# Patient Record
Sex: FEMALE | Race: OTHER | Employment: PART TIME | URBAN - METROPOLITAN AREA
[De-identification: names, ages, dates, MRNs, and addresses within clinical notes are randomized per-mention and may not be internally consistent; named-entity substitution may affect disease eponyms.]

---

## 2019-08-18 ENCOUNTER — APPOINTMENT (OUTPATIENT)
Dept: GENERAL RADIOLOGY | Age: 49
End: 2019-08-18
Attending: PHYSICIAN ASSISTANT
Payer: OTHER MISCELLANEOUS

## 2019-08-18 ENCOUNTER — HOSPITAL ENCOUNTER (OUTPATIENT)
Age: 49
Discharge: HOME | End: 2019-08-18
Payer: OTHER MISCELLANEOUS

## 2019-08-18 VITALS
SYSTOLIC BLOOD PRESSURE: 111 MMHG | OXYGEN SATURATION: 98 % | DIASTOLIC BLOOD PRESSURE: 73 MMHG | TEMPERATURE: 98 F | HEART RATE: 84 BPM | WEIGHT: 272 LBS | RESPIRATION RATE: 18 BRPM | BODY MASS INDEX: 38.94 KG/M2 | HEIGHT: 70 IN

## 2019-08-18 DIAGNOSIS — M17.11 OSTEOARTHRITIS OF RIGHT KNEE, UNSPECIFIED OSTEOARTHRITIS TYPE: ICD-10-CM

## 2019-08-18 DIAGNOSIS — M25.461 EFFUSION OF RIGHT KNEE: Primary | ICD-10-CM

## 2019-08-18 PROCEDURE — 99203 OFFICE O/P NEW LOW 30 MIN: CPT

## 2019-08-18 PROCEDURE — 73560 X-RAY EXAM OF KNEE 1 OR 2: CPT | Performed by: PHYSICIAN ASSISTANT

## 2019-08-18 NOTE — ED INITIAL ASSESSMENT (HPI)
Patient states that while at work on Friday 8-16-19 was walking in the cafeteria and slipped on a wet floor. Patient states that she fell to the ground and hit her right knee on the floor. Denies hitting her head or any LOC.  Patient complains of right knee

## 2019-08-18 NOTE — ED PROVIDER NOTES
Patient Seen in: [facility]    History   Patient presents with:  Lower Extremity Injury (musculoskeletal)    Stated Complaint: Work Injury, pain to leg (L), Knee (R)    HPI    45-year-old female here with complaint of pain and l well-developed and well-nourished. HENT:   Head: Normocephalic. Right Ear: External ear normal.   Left Ear: External ear normal.   Nose: Nose normal.   Mouth/Throat: Oropharynx is clear and moist.   Eyes: Pupils are equal, round, and reactive to light. Treatment: Please wear the Ace wrap as provided. Rest ice compression elevation. Take naproxen twice daily with food. Make a follow-up appointment with occupational medicine for further evaluation and treatment.       The patient is in good condition thr

## 2020-03-03 ENCOUNTER — HOSPITAL ENCOUNTER (OUTPATIENT)
Age: 50
Discharge: HOME | End: 2020-03-03
Attending: FAMILY MEDICINE
Payer: COMMERCIAL

## 2020-03-03 VITALS
DIASTOLIC BLOOD PRESSURE: 84 MMHG | TEMPERATURE: 98 F | WEIGHT: 272 LBS | HEART RATE: 106 BPM | BODY MASS INDEX: 38.94 KG/M2 | OXYGEN SATURATION: 99 % | RESPIRATION RATE: 20 BRPM | SYSTOLIC BLOOD PRESSURE: 118 MMHG | HEIGHT: 70 IN

## 2020-03-03 DIAGNOSIS — L03.311 CELLULITIS OF ABDOMINAL WALL: Primary | ICD-10-CM

## 2020-03-03 PROCEDURE — 99214 OFFICE O/P EST MOD 30 MIN: CPT

## 2020-03-03 PROCEDURE — 99213 OFFICE O/P EST LOW 20 MIN: CPT

## 2020-03-03 RX ORDER — SULFAMETHOXAZOLE AND TRIMETHOPRIM 800; 160 MG/1; MG/1
2 TABLET ORAL 2 TIMES DAILY
Qty: 40 TABLET | Refills: 0 | Status: SHIPPED | OUTPATIENT
Start: 2020-03-03 | End: 2020-03-13

## 2020-03-03 NOTE — ED INITIAL ASSESSMENT (HPI)
Abscess - abdomen with skin redness around  It. About size of dime Friday, she states it was getting bigger she applied warm compress Saturday. Yesterday it started to drain  Yellow pus with blood. Denies fever.

## 2020-03-03 NOTE — ED PROVIDER NOTES
Patient Seen in: [facility]      History   Patient presents with:  Abscess    Stated Complaint: boil on stomach     HPI  This is a 44-year-old female, diabetic, now coming in with an abscess noted on the abdomen.   She has had a atraumatic  EENT: OP - wnl, moist, Nares normal  NECK: FROM, supple  LUNGS: No tachypnea   CV: No tachycardia   ABD: not distended  NEURO: Alert and oriented to person place and time  GAIT: Normal          ED Course   Labs Reviewed - No data to display  Or

## 2021-05-11 ENCOUNTER — HOSPITAL ENCOUNTER (OUTPATIENT)
Age: 51
Discharge: HOME | End: 2021-05-11
Payer: COMMERCIAL

## 2021-05-11 VITALS
OXYGEN SATURATION: 96 % | TEMPERATURE: 98 F | DIASTOLIC BLOOD PRESSURE: 85 MMHG | SYSTOLIC BLOOD PRESSURE: 122 MMHG | RESPIRATION RATE: 16 BRPM | HEART RATE: 95 BPM | HEIGHT: 70 IN | BODY MASS INDEX: 37.22 KG/M2 | WEIGHT: 260 LBS

## 2021-05-11 DIAGNOSIS — L02.91 ABSCESS: Primary | ICD-10-CM

## 2021-05-11 PROCEDURE — 90471 IMMUNIZATION ADMIN: CPT

## 2021-05-11 PROCEDURE — 10061 I&D ABSCESS COMP/MULTIPLE: CPT

## 2021-05-11 PROCEDURE — 87205 SMEAR GRAM STAIN: CPT | Performed by: PHYSICIAN ASSISTANT

## 2021-05-11 PROCEDURE — 87070 CULTURE OTHR SPECIMN AEROBIC: CPT | Performed by: PHYSICIAN ASSISTANT

## 2021-05-11 PROCEDURE — 99214 OFFICE O/P EST MOD 30 MIN: CPT

## 2021-05-11 PROCEDURE — 87186 SC STD MICRODIL/AGAR DIL: CPT | Performed by: PHYSICIAN ASSISTANT

## 2021-05-11 PROCEDURE — 87077 CULTURE AEROBIC IDENTIFY: CPT | Performed by: PHYSICIAN ASSISTANT

## 2021-05-11 RX ORDER — SULFAMETHOXAZOLE AND TRIMETHOPRIM 800; 160 MG/1; MG/1
1 TABLET ORAL 2 TIMES DAILY
Qty: 14 TABLET | Refills: 0 | Status: SHIPPED | OUTPATIENT
Start: 2021-05-11 | End: 2021-05-18

## 2021-05-11 NOTE — ED PROVIDER NOTES
Patient Seen in: [facility]      History   Patient presents with:  Abscess    Stated Complaint: Inner thigh boil    HPI/Subjective:   HPI    Patient is a 49-year-old female presenting to the immediate care center with complaints of an abs Appearance: Normal appearance. She is not ill-appearing. Eyes:      Conjunctiva/sclera: Conjunctivae normal.      Pupils: Pupils are equal, round, and reactive to light. Cardiovascular:      Rate and Rhythm: Normal rate.    Pulmonary:      Effort: Pulmo Patient presents immediate care center with above complaint. Above findings are noted. Patient's abscess was drained as dictated above. Patient tetanus updated. Patient will be discharged home on antibiotics.   Advised to have wound reassessed within

## 2023-10-02 ENCOUNTER — HOSPITAL ENCOUNTER (OUTPATIENT)
Age: 53
Discharge: HOME | End: 2023-10-02

## 2023-10-02 VITALS
HEART RATE: 98 BPM | WEIGHT: 256 LBS | OXYGEN SATURATION: 97 % | RESPIRATION RATE: 22 BRPM | DIASTOLIC BLOOD PRESSURE: 75 MMHG | HEIGHT: 70 IN | SYSTOLIC BLOOD PRESSURE: 122 MMHG | TEMPERATURE: 98 F | BODY MASS INDEX: 36.65 KG/M2

## 2023-10-02 DIAGNOSIS — L03.90 CELLULITIS, UNSPECIFIED CELLULITIS SITE: Primary | ICD-10-CM

## 2023-10-02 PROCEDURE — 87077 CULTURE AEROBIC IDENTIFY: CPT | Performed by: NURSE PRACTITIONER

## 2023-10-02 PROCEDURE — 87186 SC STD MICRODIL/AGAR DIL: CPT | Performed by: NURSE PRACTITIONER

## 2023-10-02 PROCEDURE — 99214 OFFICE O/P EST MOD 30 MIN: CPT

## 2023-10-02 PROCEDURE — 87070 CULTURE OTHR SPECIMN AEROBIC: CPT | Performed by: NURSE PRACTITIONER

## 2023-10-02 PROCEDURE — 87205 SMEAR GRAM STAIN: CPT | Performed by: NURSE PRACTITIONER

## 2023-10-02 RX ORDER — CEPHALEXIN 500 MG/1
500 CAPSULE ORAL 4 TIMES DAILY
Qty: 40 CAPSULE | Refills: 0 | Status: SHIPPED | OUTPATIENT
Start: 2023-10-02 | End: 2023-10-12

## 2023-10-02 NOTE — DISCHARGE INSTRUCTIONS
Take the antibiotics as prescribed. Take an over-the-counter probiotic daily while on the antibiotics. Please read the attached discharge instructions. Go to your nearest ER for new or worsening symptoms.

## 2024-05-13 ENCOUNTER — HOSPITAL ENCOUNTER (OUTPATIENT)
Age: 54
Discharge: HOME | End: 2024-05-13

## 2024-05-13 ENCOUNTER — APPOINTMENT (OUTPATIENT)
Dept: CT IMAGING | Age: 54
End: 2024-05-13
Attending: NURSE PRACTITIONER

## 2024-05-13 VITALS
BODY MASS INDEX: 35.36 KG/M2 | SYSTOLIC BLOOD PRESSURE: 107 MMHG | OXYGEN SATURATION: 96 % | HEART RATE: 94 BPM | WEIGHT: 247 LBS | HEIGHT: 70 IN | RESPIRATION RATE: 18 BRPM | TEMPERATURE: 98 F | DIASTOLIC BLOOD PRESSURE: 74 MMHG

## 2024-05-13 DIAGNOSIS — R73.9 HYPERGLYCEMIA: ICD-10-CM

## 2024-05-13 DIAGNOSIS — L03.311 CELLULITIS OF ABDOMINAL WALL: Primary | ICD-10-CM

## 2024-05-13 LAB
#MXD IC: 0.5 X10ˆ3/UL (ref 0.1–1)
B-HCG UR QL: NEGATIVE
BUN BLD-MCNC: 13 MG/DL (ref 7–18)
CHLORIDE BLD-SCNC: 97 MMOL/L (ref 98–112)
CO2 BLD-SCNC: 24 MMOL/L (ref 21–32)
CREAT BLD-MCNC: 0.6 MG/DL
EGFRCR SERPLBLD CKD-EPI 2021: 107 ML/MIN/1.73M2 (ref 60–?)
GLUCOSE BLD-MCNC: 390 MG/DL (ref 70–99)
HCT VFR BLD AUTO: 50.6 %
HCT VFR BLD CALC: 52 %
HGB BLD-MCNC: 17.5 G/DL
ISTAT IONIZED CALCIUM FOR CHEM 8: 1.19 MMOL/L (ref 1.12–1.32)
LYMPHOCYTES # BLD AUTO: 2.2 X10ˆ3/UL (ref 1–4)
LYMPHOCYTES NFR BLD AUTO: 22.4 %
MCH RBC QN AUTO: 31.1 PG (ref 26–34)
MCHC RBC AUTO-ENTMCNC: 34.6 G/DL (ref 31–37)
MCV RBC AUTO: 90 FL (ref 80–100)
MIXED CELL %: 5.3 %
NEUTROPHILS # BLD AUTO: 7 X10ˆ3/UL (ref 1.5–7.7)
NEUTROPHILS NFR BLD AUTO: 72.3 %
PLATELET # BLD AUTO: 306 X10ˆ3/UL (ref 150–450)
POTASSIUM BLD-SCNC: 3.6 MMOL/L (ref 3.6–5.1)
RBC # BLD AUTO: 5.62 X10ˆ6/UL
SODIUM BLD-SCNC: 137 MMOL/L (ref 136–145)
WBC # BLD AUTO: 9.7 X10ˆ3/UL (ref 4–11)

## 2024-05-13 PROCEDURE — 85025 COMPLETE CBC W/AUTO DIFF WBC: CPT | Performed by: NURSE PRACTITIONER

## 2024-05-13 PROCEDURE — 99214 OFFICE O/P EST MOD 30 MIN: CPT

## 2024-05-13 PROCEDURE — 74177 CT ABD & PELVIS W/CONTRAST: CPT | Performed by: NURSE PRACTITIONER

## 2024-05-13 PROCEDURE — 81025 URINE PREGNANCY TEST: CPT

## 2024-05-13 PROCEDURE — 96361 HYDRATE IV INFUSION ADD-ON: CPT

## 2024-05-13 PROCEDURE — 96374 THER/PROPH/DIAG INJ IV PUSH: CPT

## 2024-05-13 PROCEDURE — 99215 OFFICE O/P EST HI 40 MIN: CPT

## 2024-05-13 PROCEDURE — 80047 BASIC METABLC PNL IONIZED CA: CPT

## 2024-05-13 RX ORDER — SODIUM CHLORIDE 9 MG/ML
1000 INJECTION, SOLUTION INTRAVENOUS ONCE
Status: COMPLETED | OUTPATIENT
Start: 2024-05-13 | End: 2024-05-13

## 2024-05-13 RX ORDER — CEFADROXIL 500 MG/1
500 CAPSULE ORAL 2 TIMES DAILY
Qty: 20 CAPSULE | Refills: 0 | Status: SHIPPED | OUTPATIENT
Start: 2024-05-13 | End: 2024-05-23

## 2024-05-13 NOTE — ED INITIAL ASSESSMENT (HPI)
Pt with boil on stomach for about 2 weeks, now worsening redness, drainage    Tmax 99.5-100.1f since Thurs off and on

## 2024-05-13 NOTE — ED PROVIDER NOTES
Patient Seen in: [facility]      History     Chief Complaint   Patient presents with    Abscess    Fever     Stated Complaint: cyst,fever    Subjective:   Very pleasant 53-year-old female presents to the immediate care for redness and warmth to her pannus.  Patient reports that she has had an abscess to this area in the past started with a small blister 1 week ago notices more red and hard.  States yesterday she started with low-grade fevers.  Patient does not check her blood sugar regularly, reports taking her meds            Objective:   Past Medical History:    Diabetes (HCC)              History reviewed. No pertinent surgical history.             Social History     Socioeconomic History    Marital status:    Tobacco Use    Smoking status: Never    Smokeless tobacco: Never   Vaping Use    Vaping status: Never Used   Substance and Sexual Activity    Alcohol use: Never    Drug use: Never     Social Determinants of Health      Received from [facility]              Review of Systems   Constitutional: Negative.    Respiratory: Negative.     Cardiovascular: Negative.    Gastrointestinal: Negative.    Skin:  Positive for color change.   Neurological: Negative.        Positive for stated complaint: cyst,fever  Other systems are as noted in HPI.  Constitutional and vital signs reviewed.      All other systems reviewed and negative except as noted above.    Physical Exam     ED Triage Vitals [05/13/24 0935]   /77   Pulse 106   Resp 18   Temp 97.1 °F (36.2 °C)   Temp src Temporal   SpO2 96 %   O2 Device None (Room air)       Current Vitals:   Vital Signs  BP: 112/77  Pulse: 106  Resp: 18  Temp: 97.1 °F (36.2 °C)  Temp src: Temporal    Oxygen Therapy  SpO2: 96 %  O2 Device: None (Room air)            Physical Exam  Vitals and nursing note reviewed.   Constitutional:       General: She is not in acute distress.  HENT:      Head: Normocephalic.      Nose: Nose normal.    Cardiovascular:      Rate and Rhythm: Normal rate.   Pulmonary:      Effort: Pulmonary effort is normal.   Musculoskeletal:         General: Normal range of motion.   Skin:     General: Skin is warm and dry.      Findings: Erythema present.             Comments: Induration, warmth and tenderness   Neurological:      General: No focal deficit present.      Mental Status: She is alert and oriented to person, place, and time.               ED Course     Labs Reviewed   POCT CBC - Abnormal; Notable for the following components:       Result Value    RBC IC 5.62 (*)     HGB IC 17.5 (*)     HCT IC 50.6 (*)     All other components within normal limits   POCT ISTAT CHEM8 CARTRIDGE - Abnormal; Notable for the following components:    ISTAT Chloride 97 (*)     ISTAT Hematocrit 52 (*)     ISTAT Glucose 390 (*)     All other components within normal limits   POCT PREGNANCY URINE - Normal     CT ABDOMEN+PELVIS(CONTRAST ONLY)(CPT=74177)    Result Date: 5/13/2024  PROCEDURE:  CT ABDOMEN+PELVIS (CONTRAST ONLY) (CPT=74177)  COMPARISON:  [facility] , CT, CT ABD/PLV(S) KIDNEYSTONE W/3D, 8/29/2010, 4:02 PM.  INDICATIONS:  cyst,fever, abdominal lump  TECHNIQUE:  CT scanning was performed from the dome of the diaphragm to the pubic symphysis with non-ionic intravenous contrast material. Post contrast coronal MPR imaging was performed.  Dose reduction techniques were used. Dose information is transmitted to the ACR (American College of Radiology) NRDR (National Radiology Data Registry) which includes the Dose Index Registry.  PATIENT STATED HISTORY:(As transcribed by Technologist)  Patient presents with lower abdominal lump/swelling started out with boil now getting bigger and hard.    CONTRAST USED:  87cc of Isovue 370  FINDINGS:  LIVER:  No enlargement, atrophy, abnormal density, or significant focal lesion.  BILIARY:  No visible dilatation or calcification.  PANCREAS:  No lesion, fluid collection, ductal dilatation, or atrophy.  SPLEEN:   No enlargement or focal lesion.  KIDNEYS:  5 mm stone of the left lower pole on image 66. 2.1 cm probable cyst of the left kidney.  No specific further follow-up is recommended.  ADRENALS:  No mass or enlargement.  AORTA/VASCULAR:  No aneurysm or dissection.  RETROPERITONEUM:  No mass or adenopathy.  BOWEL/MESENTERY:  No dilated loops of small bowel are seen.  Portions of the bowel are decompressed, limiting evaluation.  The appendix is not clearly identified.  Lack of oral contrast limits assessment of the bowel.  No free fluid is seen. ABDOMINAL WALL:  There is skin thickening of the anterior pelvic wall.  There is associated soft tissue stranding within the subcutaneous fat.  No evidence of an abscess or drainable fluid collection. URINARY BLADDER:  Urinary bladder is decompressed, limiting evaluation. PELVIC NODES:  No adenopathy.  PELVIC ORGANS:  Unremarkable CT appearance.  Please note that uterus and ovaries are not well assessed with CT. BONES:  No bony lesion or fracture.  LUNG BASES:  No visible pulmonary or pleural disease.  OTHER:  Negative.             CONCLUSION:  There is thickening of the skin within lower anterior pelvic wall.  There is associated soft tissue stranding present.  This most likely represents cellulitis.  No evidence of an abscess or drainable fluid collection at this time.  5 mm stone of the left renal lower pole.   LOCATION:  [facility]   Dictated by (CST): Jose Martin MD on 5/13/2024 at 10:52 AM     Finalized by (CST): Jose Martin MD on 5/13/2024 at 10:59 AM                       [facility]      Medical Decision Making  Pertinent Labs & Imaging studies reviewed. (See chart for details).  Patient coming in with warmth and fever with tenderness to abdomen.   Differential diagnosis includes cellulitis, abscess  Labs reviewed no elevated white count. Radiology CT reveals cellulitis without discernible abscess.  Will treat for cellulitis, hyperglycemia.  Will discharge on IV Ancef given, Duricef  sent, close follow-up.  Strict return precautions discussed patient is comfortable with this plan.     Overall Pt looks good. Non-toxic, well-hydrated and in no respiratory distress. Vital signs are reassuring. Exam is reassuring. I do not believe pt requires and additional diagnostic studies or intervention. I believe pt can be discharged home to continue evaluation as an outpatient. Follow-up provider given. Discharge instructions given and reviewed. Return for any problems. All understand and agreewith the plan.     Please note that this report has been produced using speech recognition software and may contain errors related to that system including, but not limited to, errors in grammar, punctuation, and spelling, as well as words and phrases that possibly may have been recognized inappropriately. If there are any questions or concerns, contact the dictating provider for clarification.       The 21st Century Cures Act makes Medical Notes like these available to patients in the interest of transparency.  However, be advised this is a medical document.  It is intended as peer to peer communication.  It is written in medical language and may contain abbreviations or verbiage that are unfamiliar.  It may appear blunt or direct.  Medical documents are intended to carry relevant information, facts as evident, and the clinical opinion of the practitioner      Problems Addressed:  Cellulitis of abdominal wall: acute illness or injury  Hyperglycemia: acute illness or injury    Amount and/or Complexity of Data Reviewed  Labs: ordered. Decision-making details documented in ED Course.  Radiology: ordered. Decision-making details documented in ED Course.    Risk  OTC drugs.  Prescription drug management.        Disposition and Plan     Clinical Impression:  1. Cellulitis of abdominal wall         Disposition:  Discharge  5/13/2024 11:50 am    Follow-up:  No follow-up provider specified.        Medications  Prescribed:  Current Discharge Medication List        START taking these medications    Details   cefadroxil 500 MG Oral Cap Take 1 capsule (500 mg total) by mouth 2 (two) times daily for 10 days.  Qty: 20 capsule, Refills: 0

## 2024-05-13 NOTE — DISCHARGE INSTRUCTIONS
Go directly to the emergency department for increasing fevers, uncontrolled blood sugars, nausea vomiting or any other concerns  Antibiotics tonight

## (undated) NOTE — LETTER
Date & Time: 5/13/2024, 12:16 PM  Patient: Gertrudis Byrne  Encounter Provider(s):    Afia Vick APRN       To Whom It May Concern:    Gertrudis Byrne was seen and treated in our department on 5/13/2024. She should not return to work until 05/15/2024 .    If you have any questions or concerns, please do not hesitate to call.        Afia Vick NP-C  Nurse Practitioner    This note has been electronically signed

## (undated) NOTE — LETTER
Date & Time: 5/11/2021, 3:57 PM  Patient: Prateek Irby  Encounter Provider(s):    Marguerite Blanton       To Whom It May Concern:    Prateek Irby was seen and treated in our department on 5/11/2021. She may return to work on 5/12/21.     If you have